# Patient Record
Sex: FEMALE | Race: OTHER | HISPANIC OR LATINO | URBAN - METROPOLITAN AREA
[De-identification: names, ages, dates, MRNs, and addresses within clinical notes are randomized per-mention and may not be internally consistent; named-entity substitution may affect disease eponyms.]

---
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
## 2024-08-05 NOTE — ED PEDIATRIC NURSE NOTE - CHPI ED SYMPTOMS NEG
explained to pt and family, to use bedpan./assisted to bedpan
no vomiting/no nausea/no chills/no fever/no hematuria

## 2025-01-31 NOTE — ED PROVIDER NOTE - CARDIAC
[Postmenopausal] : The patient is postmenopausal
Regular rate and rhythm, Heart sounds S1 S2 present, no murmurs, rubs or gallops